# Patient Record
(demographics unavailable — no encounter records)

---

## 2025-06-25 NOTE — PHYSICAL EXAM
[Chaperone Declined] : Chaperone offered however refused by patient, [FreeTextEntry5] : pt preference [Appropriately responsive] : appropriately responsive [Alert] : alert [No Acute Distress] : no acute distress [Soft] : soft [Non-tender] : non-tender [Non-distended] : non-distended [Oriented x3] : oriented x3 [Examination Of The Breasts] : a normal appearance [No Masses] : no breast masses were palpable [Labia Majora] : normal [Labia Minora] : normal [Normal] : normal [Absent] : absent [Uterine Adnexae] : non-palpable

## 2025-06-25 NOTE — HISTORY OF PRESENT ILLNESS
[Patient reported mammogram was abnormal] : Patient reported mammogram was abnormal [Patient reported PAP Smear was normal] : Patient reported PAP Smear was normal [Patient reported colonoscopy was normal] : Patient reported colonoscopy was normal [Post-Menopause, No Sxs] : post-menopausal, currently without symptoms [Previously active] : previously active [FreeTextEntry1] : 55 y/o  postmenopausal F presents to establish care no complaints  PGYN:  1 TOP  hx of fibroid uterus s/p NOAM BS 18   Works as mental health tech at [employer] [Mammogramdate] : 6/25 [TextBox_19] : for biopsy [PapSmeardate] : 3/22 [ColonoscopyDate] : 4/2023 [TextBox_43] : 4/28 [FreeTextEntry2] : years ago

## 2025-06-25 NOTE — PLAN
[FreeTextEntry1] : 55 y/o  postmenopausal F presents to establish care  HCM no pap indicated UTD on all other HCM   Patient screened for depression- no signs of clinical depression. PHQ-9 scores reviewed over the course of the visit 5-10 minutes of face to face time. Follow up with changes in mood including other symptoms of anxiety

## 2025-07-30 NOTE — PHYSICAL EXAM
[No Acute Distress] : no acute distress [Well Nourished] : well nourished [Well Developed] : well developed [Well-Appearing] : well-appearing [Normal Sclera/Conjunctiva] : normal sclera/conjunctiva [PERRL] : pupils equal round and reactive to light [EOMI] : extraocular movements intact [No JVD] : no jugular venous distention [Supple] : supple [No Lymphadenopathy] : no lymphadenopathy [Thyroid Normal, No Nodules] : the thyroid was normal and there were no nodules present [No Respiratory Distress] : no respiratory distress  [Clear to Auscultation] : lungs were clear to auscultation bilaterally [No Accessory Muscle Use] : no accessory muscle use [Normal Rate] : normal rate  [Regular Rhythm] : with a regular rhythm [Normal S1, S2] : normal S1 and S2 [No Murmur] : no murmur heard [No Carotid Bruits] : no carotid bruits [No Abdominal Bruit] : a ~M bruit was not heard ~T in the abdomen [No Varicosities] : no varicosities [Pedal Pulses Present] : the pedal pulses are present [No Edema] : there was no peripheral edema [No Extremity Clubbing/Cyanosis] : no extremity clubbing/cyanosis [No Palpable Aorta] : no palpable aorta [Normal Appearance] : normal in appearance [No Nipple Discharge] : no nipple discharge [No Axillary Lymphadenopathy] : no axillary lymphadenopathy [Soft] : abdomen soft [Non Tender] : non-tender [Non-distended] : non-distended [No Masses] : no abdominal mass palpated [No HSM] : no HSM [Normal Bowel Sounds] : normal bowel sounds [Normal Posterior Cervical Nodes] : no posterior cervical lymphadenopathy [Normal Anterior Cervical Nodes] : no anterior cervical lymphadenopathy [No CVA Tenderness] : no CVA  tenderness [No Spinal Tenderness] : no spinal tenderness [No Joint Swelling] : no joint swelling [Grossly Normal Strength/Tone] : grossly normal strength/tone [No Rash] : no rash [Normal Gait] : normal gait [Coordination Grossly Intact] : coordination grossly intact [No Focal Deficits] : no focal deficits [Deep Tendon Reflexes (DTR)] : deep tendon reflexes were 2+ and symmetric [Normal Affect] : the affect was normal [Normal Insight/Judgement] : insight and judgment were intact

## 2025-07-30 NOTE — ASSESSMENT
[FreeTextEntry1] : (1) HCM - discussed diet, exercise, weight loss.  Patient declines seeing the nutritionist.  She was encouraged to keep a food log, and to establish how much she is eating in a typical day.  Labs drawn ahead of visit and reviewed with patient.   Patient received the influenza vaccination last season.   She declines further COVID-19 boosters.  Tdap UTD 4/22/2024.  Prevnar-20 given today.  She reports 2 doses of Shingrix at [facility], and I asked her to forward the dates.  Continue screening mammogram and breast US as directed by radiology due to recent fibroadenomatoid finding.  Follow-up with Gyn as directed.  Screening colonoscopy 2023 had 1 hyperplastic polyp (Dr. Robert Riggs), and patient can have 10-year follow-up.  Patient to have annual eye exams.  Patient given Health Care Proxy information today.  (2) Endo - continue levothyroxine for hypothyroidism.  Patient was seeing Endocrine, and her endocrinologist retired.  She can continue to have TFTs checked in our office.  (3) Seasonal allergies - Flonase prn.  (4) Physiatry follow-up as needed for recurrent L neck sprain/pains.  She has continued numbness in her hands, and I gave her a Neuro referral.  (5) CV - patient has borderline/mildly elevated BP.  Will repeat in a follow-up visit in several months.  (6) Prediabetes - reviewed diet.  Follow-up in 3 months.

## 2025-07-30 NOTE — HISTORY OF PRESENT ILLNESS
[de-identified] : Patient presents for a follow-up annual physical.  Patient is a 54-year-old female with a history of hypothyroidism, seasonal allergies.  She was seeing Endocrine for the thyroid.  Her Endocrinologist (Dr. Gonzalez) recently retired.  Her allergies are controlled.  She sleeps with window open and has air purifier in her bedroom.  She uses Visine for dry eyes.  She saw physiatry in 2024 for neck pain from a cervical radiculopathy and had PT.  She is getting treatment by her dentist for gum disease and is currently on amoxicillin and Motrin.  Patient received the influenza vaccination approx Nov 2023.  She did not get the [id number] COVID-19 booster.  She is not sure of the last Tdap and will take it today.  She had 2 doses of Shingrix at [facility] (Marcello Ro).

## 2025-07-30 NOTE — HEALTH RISK ASSESSMENT
[Yes] : Yes [2 - 4 times a month (2 pts)] : 2-4 times a month (2 points) [No] : In the past 12 months have you used drugs other than those required for medical reasons? No [No falls in past year] : Patient reported no falls in the past year [0] : 2) Feeling down, depressed, or hopeless: Not at all (0) [Patient reported PAP Smear was normal] : Patient reported PAP Smear was normal [Patient reported colonoscopy was normal] : Patient reported colonoscopy was normal [None] : None [With Family] : lives with family [Employed] : employed [Sexually Active] : sexually active [Feels Safe at Home] : Feels safe at home [Fully functional (bathing, dressing, toileting, transferring, walking, feeding)] : Fully functional (bathing, dressing, toileting, transferring, walking, feeding) [Fully functional (using the telephone, shopping, preparing meals, housekeeping, doing laundry, using] : Fully functional and needs no help or supervision to perform IADLs (using the telephone, shopping, preparing meals, housekeeping, doing laundry, using transportation, managing medications and managing finances) [Reports normal functional visual acuity (ie: able to read med bottle)] : Reports normal functional visual acuity [Smoke Detector] : smoke detector [Carbon Monoxide Detector] : carbon monoxide detector [Safety elements used in home] : safety elements used in home [Seat Belt] :  uses seat belt [Sunscreen] : uses sunscreen [Little interest or pleasure doing things] : 1) Little interest or pleasure doing things [Feeling down, depressed, or hopeless] : 2) Feeling down, depressed, or hopeless [de-identified] : 3-4x/week, 1 hour sessions.  Bicycles. [[id number]] : 0 [Change in mental status noted] : No change in mental status noted [Language] : denies difficulty with language [Behavior] : denies difficulty with behavior [Learning/Retaining New Information] : denies difficulty learning/retaining new information [Handling Complex Tasks] : denies difficulty handling complex tasks [Reasoning] : denies difficulty with reasoning [Spatial Ability and Orientation] : denies difficulty with spatial ability and orientation [High Risk Behavior] : no high risk behavior [Reports changes in hearing] : Reports no changes in hearing [Reports changes in vision] : Reports no changes in vision [Reports changes in dental health] : Reports no changes in dental health [Travel to Developing Areas] : does not  travel to developing areas [MammogramDate] : 06/25 [MammogramComments] : Bx 2025 - fibroadenomatoid change. [PapSmearDate] : 03/22 [PapSmearComments] : Dr. Mercedez Barnes.  no further PAP smears  [ColonoscopyDate] : 04/23 [ColonoscopyComments] : 1 hyperplastic polyp in ascending colon, lipoma.  10 year follow-up. [HIVDate] : 03/22 [HIVComments] : Negative [de-identified] : Dr. Mitul Lyman. [de-identified] : Going regularly. [AdvancecareDate] : 07/30/2025 [FreeTextEntry4] : Patient given Health Care Proxy information today.